# Patient Record
(demographics unavailable — no encounter records)

---

## 2024-10-15 NOTE — HEALTH RISK ASSESSMENT
[Never (0 pts)] : Never (0 points) [No] : In the past 12 months have you used drugs other than those required for medical reasons? No [0] : 2) Feeling down, depressed, or hopeless: Not at all (0) [PHQ-2 Negative - No further assessment needed] : PHQ-2 Negative - No further assessment needed [Never] : Never [Patient declined mammogram] : Patient declined mammogram [Patient reported PAP Smear was normal] : Patient reported PAP Smear was normal [Patient declined colonoscopy] : Patient declined colonoscopy [Fully functional (bathing, dressing, toileting, transferring, walking, feeding)] : Fully functional (bathing, dressing, toileting, transferring, walking, feeding) [Fully functional (using the telephone, shopping, preparing meals, housekeeping, doing laundry, using] : Fully functional and needs no help or supervision to perform IADLs (using the telephone, shopping, preparing meals, housekeeping, doing laundry, using transportation, managing medications and managing finances) [Smoke Detector] : smoke detector [Carbon Monoxide Detector] : carbon monoxide detector [Seat Belt] :  uses seat belt [Audit-CScore] : 0 [de-identified] : Walks a lot [de-identified] : Vegetarian [[id number]] : 0 [MammogramComments] : Breast US in the past demonstrated abnormalities, has implants [PapSmearDate] : 01/2022 [BoneDensityComments] : N/A [ColonoscopyComments] : f/u COLOGUARD

## 2024-10-15 NOTE — ASSESSMENT
[FreeTextEntry1] : Pt is a 60 yo Female with no significant PMH was recently admitted to [facility] for AHRF 2/2 to Mycoplasma PNA. Patient p/w residual cough, tongue discoloration and endorses small pellet-like stools.  #Cough - start benzonatate 100 TID PRN - start Azelastine  #Tongue Abnormality - f/u DERM  #Breast Cyst? - f/u breast US - f/u with gynecology   #Constipation - start bowel regimen with MIRALAX PRN and senna BID  #HCM Vaccines: Interested in Shingles and Flu, reconsider Colonoscopy: f/u COLOGUARD Pap: f/u GYN Mammo: f/u Breast US f/u CBC, CMP, HLD, UA, A1C, nutritionist   RTC IN 1 MONTH or PRN

## 2024-10-15 NOTE — HISTORY OF PRESENT ILLNESS
[FreeTextEntry1] : Establish care, residual cough, tongue discoloration [de-identified] : Pt is a 60 yo Female with no significant PMH was recently admitted to [facility] for AHRF 2/2 to Mycoplasma PNA. Patient reports feeling better, with residual cough. Patient continues to be anxious and experienced SOB during stressful event. Patient also concerned with regards to weight gain and tongue discoloration. Patient endorses small pellet-like stools. Denies TANNER, SOB at rest, orthopnea, PND, nausea/vomiting, diarrhea, chest pain, or palpitations.

## 2024-10-15 NOTE — REVIEW OF SYSTEMS
[Recent Change In Weight] : ~T recent weight change [Constipation] : constipation [Cough] : cough [Negative] : Heme/Lymph [FreeTextEntry4] : Tongue discoloration

## 2024-10-15 NOTE — HISTORY OF PRESENT ILLNESS
[FreeTextEntry1] : Annual   LMP 51 yrs of age, no hx/o PMB  Last Pap() w/hx/o HPV  Last Mammogram - declines mammogram, only does annual Breast U/S due hx/o breast implants.  No personal or family hx/o Breast Ca.  Hx/o R-Breast cyst, s/p clip placement. Encouraged pt to obtain screening Mammogram.  Colorectal screening pt is considering Cologuard - counseling provided recommended colonoscopy as preferred method of screening, pt will f/u with her PCP,     / s/p  x 1 / ETOP x 1 denies hx/o bladder dysfunction.   , dating but not sexually active.  Lives alone, self-employed.  [Post-Menopause, No Sxs] : post-menopausal, currently without symptoms [Menopause Age: ____] : age at menopause was [unfilled] [No] : Patient does not have concerns regarding sex [Previously active] : previously active [Men] : men [Vaginal] : vaginal

## 2024-10-15 NOTE — HISTORY OF PRESENT ILLNESS
[FreeTextEntry1] : Establish care, residual cough, tongue discoloration [de-identified] : Pt is a 62 yo Female with no significant PMH was recently admitted to [facility] for AHRF 2/2 to Mycoplasma PNA. Patient reports feeling better, with residual cough. Patient continues to be anxious and experienced SOB during stressful event. Patient also concerned with regards to weight gain and tongue discoloration. Patient endorses small pellet-like stools. Denies TANNER, SOB at rest, orthopnea, PND, nausea/vomiting, diarrhea, chest pain, or palpitations.

## 2024-10-15 NOTE — ASSESSMENT
[FreeTextEntry1] : Pt is a 62 yo Female with no significant PMH was recently admitted to [facility] for AHRF 2/2 to Mycoplasma PNA. Patient p/w residual cough, tongue discoloration and endorses small pellet-like stools.  #Cough - start benzonatate 100 TID PRN - start Azelastine  #Tongue Abnormality - f/u DERM  #Breast Cyst? - f/u breast US - f/u with gynecology   #Constipation - start bowel regimen with MIRALAX PRN and senna BID  #HCM Vaccines: Interested in Shingles and Flu, reconsider Colonoscopy: f/u COLOGUARD Pap: f/u GYN Mammo: f/u Breast US f/u CBC, CMP, HLD, UA, A1C, nutritionist   RTC IN 1 MONTH or PRN

## 2024-10-15 NOTE — PHYSICAL EXAM
[Normal] : soft, non-tender, non-distended, no masses palpated, no HSM and normal bowel sounds [de-identified] : Central tongue discoloration

## 2024-10-15 NOTE — PHYSICAL EXAM
[Normal] : soft, non-tender, non-distended, no masses palpated, no HSM and normal bowel sounds [de-identified] : Central tongue discoloration

## 2024-10-15 NOTE — HEALTH RISK ASSESSMENT
[Never (0 pts)] : Never (0 points) [No] : In the past 12 months have you used drugs other than those required for medical reasons? No [0] : 2) Feeling down, depressed, or hopeless: Not at all (0) [PHQ-2 Negative - No further assessment needed] : PHQ-2 Negative - No further assessment needed [Never] : Never [Patient declined mammogram] : Patient declined mammogram [Patient reported PAP Smear was normal] : Patient reported PAP Smear was normal [Patient declined colonoscopy] : Patient declined colonoscopy [Fully functional (bathing, dressing, toileting, transferring, walking, feeding)] : Fully functional (bathing, dressing, toileting, transferring, walking, feeding) [Fully functional (using the telephone, shopping, preparing meals, housekeeping, doing laundry, using] : Fully functional and needs no help or supervision to perform IADLs (using the telephone, shopping, preparing meals, housekeeping, doing laundry, using transportation, managing medications and managing finances) [Smoke Detector] : smoke detector [Carbon Monoxide Detector] : carbon monoxide detector [Seat Belt] :  uses seat belt [Audit-CScore] : 0 [de-identified] : Walks a lot [de-identified] : Vegetarian [[id number]] : 0 [MammogramComments] : Breast US in the past demonstrated abnormalities, has implants [PapSmearDate] : 01/2022 [BoneDensityComments] : N/A [ColonoscopyComments] : f/u COLOGUARD

## 2024-10-15 NOTE — END OF VISIT
[] : Resident [FreeTextEntry3] : I, Dr. Augustine Lynch, saw and evaluated this patient in the presence of the resident. I discussed the management with the resident. I reviewed the note and agree with the documented plan of care with the following confirmations/corrections/additions: None.

## 2024-10-15 NOTE — PHYSICAL EXAM
[Chaperone Present] : A chaperone was present in the examining room during all aspects of the physical examination [[id number]] : A chaperone was present during the pelvic exam. [Appropriately responsive] : appropriately responsive [Alert] : alert [No Acute Distress] : no acute distress [No Lymphadenopathy] : no lymphadenopathy [Soft] : soft [Non-tender] : non-tender [Non-distended] : non-distended [No HSM] : No HSM [No Lesions] : no lesions [No Mass] : no mass [Oriented x3] : oriented x3 [Examination Of The Breasts] : a normal appearance [No Masses] : no breast masses were palpable [Labia Majora] : normal [Labia Minora] : normal [Atrophy] : atrophy [Cystocele] : a cystocele [Rectocele] : a rectocele [Normal] : normal

## 2025-02-07 NOTE — HISTORY OF PRESENT ILLNESS
[No Pertinent Cardiac History] : no history of aortic stenosis, atrial fibrillation, coronary artery disease, recent myocardial infarction, or implantable device/pacemaker [No Pertinent Pulmonary History] : no history of asthma, COPD, sleep apnea, or smoking [No Adverse Anesthesia Reaction] : no adverse anesthesia reaction in self or family member [(Patient denies any chest pain, claudication, dyspnea on exertion, orthopnea, palpitations or syncope)] : Patient denies any chest pain, claudication, dyspnea on exertion, orthopnea, palpitations or syncope [Good (7-10 METs)] : Good (7-10 METs) [Chronic Anticoagulation] : no chronic anticoagulation [Chronic Kidney Disease] : no chronic kidney disease [Diabetes] : no diabetes [FreeTextEntry1] : Abdominal Laser Liposuction [FreeTextEntry2] : March 17, 2025 [FreeTextEntry4] : Pt is a 62 yo Female with no significant PMH here for preop clearance for abdominal laser liposuction. The only medication she is currently taking is senna PRN for constipation. Denies any adverse cardiac events, cerebrovascular events or any allergic reactions to anesthesia. Denies chest pain, SOB, palpitations, fatigue, dizziness, dysuria or diarrhea. RCRI: 0 (3.9%) Cano score: 0 METs 9.7

## 2025-02-07 NOTE — PLAN
[FreeTextEntry1] : Pt is a 62 yo Female with no significant PMH here for preop clearance for abdominal laser liposuction.   1. Preop clearance for abdominal laser liposuction - f/u CBC, CMP, PTT, PT/INR, HIV Ag/Ab, HCV Ab, UA, EKG  2. Constipation - c/w senna at bedtime PRN

## 2025-02-07 NOTE — PLAN
[FreeTextEntry1] : Pt is a 60 yo Female with no significant PMH here for preop clearance for abdominal laser liposuction.   1. Preop clearance for abdominal laser liposuction - f/u CBC, CMP, PTT, PT/INR, HIV Ag/Ab, HCV Ab, UA, EKG  2. Constipation - c/w senna at bedtime PRN

## 2025-02-07 NOTE — ASSESSMENT
[FreeTextEntry4] : Pt is a 62 yo Female with no significant PMH here for preop clearance for abdominal laser liposuction.

## 2025-03-06 NOTE — PHYSICAL EXAM
[Alert] : alert [No Acute Distress] : no acute distress [Conjunctiva] : the conjunctiva were normal in both eyes [PERRL] : pupils were equal in size, round, and reactive to light [EOM Intact] : extraocular movements were intact [Outer Ear] : the ears and nose were normal in appearance [Oropharynx] : the oropharynx was normal [Normal Appearance] : was normal in appearance [Neck Supple] : was supple [Enlarged Diffusely] : was not enlarged [___] : a single ~M [unfilled] ~Ucm nodule palpated on the right lobe of the thyroid [Rate ___] : at [unfilled] breaths per minute [Normal Rhythm/Effort] : normal respiratory rhythm and effort [Clear Bilaterally] : the lungs were clear to auscultation bilaterally [Normal to Percussion] : the lungs were normal to percussion [5th Left ICS - MCL] : palpated at the 5th LICS in the midclavicular line [Normal Rate] : normal [Heart Rate ___] : [unfilled] bpm [Rhythm Regular] : regular [Normal S1] : normal S1 [Normal S2] : normal S2 [S3] : no S3 [S4] : no S4 [No Murmur] : no murmurs heard [No Pitting Edema] : no pitting edema present [Rt] : varicose veins of the right leg noted [Lt] : varicose veins of the left leg noted [Right Carotid Bruit] : no bruit heard over the right carotid [Left Carotid Bruit] : no bruit heard over the left carotid [Right Femoral Bruit] : no bruit heard over the right femoral artery [Left Femoral Bruit] : no bruit heard over the left femoral artery [2+] : left 2+ [No Abnormalities] : the abdominal aorta was not enlarged and no bruit was heard [Bruit] : no bruit heard [Soft, Nontender] : the abdomen was soft and nontender [No Mass] : no masses were palpated [No HSM] : no hepatosplenomegaly noted [None] : no CVA tenderness [Postauricular Lymph Nodes Enlarged Bilaterally] : nodes not enlarged [Preauricular Lymph Nodes Enlarged Bilaterally] : nodes not enlarged [Submandibular Lymph Nodes Enlarged Bilaterally] : nodes not enlarged [Suboccipital Lymph Nodes Enlarged Bilaterally] : nodes not enlarged [Submental Lymph Nodes Enlarged] : nodes not enlarged [Cervical Lymph Nodes Enlarged Posterior Bilaterally] : nodes not enlarged [Cervical Lymph Nodes Enlarged Anterior Bilaterally] : nodes not enlarged [Supraclavicular Lymph Nodes Enlarged Bilaterally] : nodes not enlarged [Axillary Lymph Nodes Enlarged Bilaterally] : nodes not enlarged [Epitrochlear Lymph Nodes Enlarged Bilaterally] : nodes not enlarged [Femoral Lymph Nodes Enlarged Bilaterally] : nodes not enlarged [Inguinal Lymph Nodes Enlarged Bilaterally] : nodes not enlarged [No Lymphangitis] : no lymphangitis observed [No Visual Abnormalities] : no visible abnormalities [Normal Lordosis] : normal lordosis [No Scoliosis] : no scoliosis [No Tenderness to Palpation] : no spine tenderness on palpation [No Masses] : no masses [Full ROM] : full ROM [No Pain with ROM] : no pain with motion in any direction [Intact] : all reflexes within normal limits bilaterally [Normal Station and Gait] : the gait and station were normal [Normal Motor Tone] : the muscle tone was normal [Involuntary Movements] : no involuntary movements were seen [Normal Scalp] : inspection of the scalp showed no abnormalities [Examination Of The Hair] : texture and distribution of hair was normal [Abnormal Color] : normal color and pigmentation [Complexion Medium] : medium complexion [Skin Lesions 1] : no skin lesions were observed [Tattoo - Single] : no tattoos observed [Skin Turgor Decreased] : normal skin turgor [Normal] : the deep tendon reflexes were normal [Normal Mental Status] : the patient's orientation, memory, attention, language and fund of knowledge were normal [Appropriate] : appropriate [Impaired judgment] : intact judgment [Impaired Insight] : intact insight [de-identified] :  lips   [de-identified] :  breast  examined sitting and supine  bilateral implants   no masses no nipple abn or discharge.

## 2025-03-06 NOTE — ASSESSMENT
[FreeTextEntry1] :  1 constipation  Fiber  is a substance found in plants. Dietary fiber is a type of carbohydrate we eat. Eating the right amount of fiber has been shown to have a range of health benefits. It helps you feel full longer, which can curb overeating and weight gain. Eating fiber-rich foods aids in digestion  and the absorption of nutrients.  Foods that are high in fiber can help treat certain issues. These include constipation, irritable bowel  syndrome (IBS), and diverticulitis. Dietary fiber can reduce your risk of coronary heart  disease, type 2 diabetes, and some cancers. It also may lower your cholesterol.  Path to improved health  The amount of fiber you should get from your daily diet  depends on your age and gender. Men 50 years of age and younger should consume at least 38 grams of fiber per day. Men older than 50 years of age should get at least 30 grams of fiber daily. Women 50 years of age and younger should consume at least 25 grams of fiber per day. Women older than 50 years of age should get at least 21 grams of fiber daily.  The following changes can increase the fiber in your diet. 1.Eat at least 2 cups of fruits and 2 1/2 cups of vegetables each day. This can include: 1 artichoke 1 medium sweet potato 1 small pear  cup of green peas  cup of berries, such as raspberries or blackberries  cup of prunes  cup figs or dates  cup of spinach 1 medium apple 1 medium orange. 2.Replace refined white bread with whole-grain breads and cereals. Choose brown rice instead of white rice. Eat the following foods: 100% whole-wheat bread oatmeal brown rice bran muffins bran or multiple-grain cereals, cooked or dry popcorn (unbuttered) almonds. 3.Check nutrition fact labels for the amount of dietary fiber. Try to get 5 grams of fiber per serving. 4.Add  cup of wheat bran (millers bran) to foods. You can put it in cooked cereal, applesauce, or meat loaf. 5.Eat  cup cooked beans, such as navy, kidney, [place], black, lima, or white beans.  Things to consider  Try not to add too much fiber to your diet at once. You may get symptoms such as bloating, cramping, or gas. You can prevent these by increasing your fiber slowly. Start with one of the changes listed above. Wait several days to a week before making another. If one change doesnt seem to work for you, try a different one. Be sure to drink more fluids as you increase the amount of fiber you eat. Fluids help your body digest fiber. Try to drink 8 glasses a day. Choose no- or low-calorie beverages, such as water, unsweetened tea, or diet soda. 2  colon cancer screening  The finding of polyps in the colon or rectum often raises questions for patients and their family. What is the significance of finding a polyp? Does this mean that I have, or will develop, colon or rectal (colorectal) cancer? Will a polyp require surgery?  Some types of polyps (called adenomas) have the potential to become cancerous, while others (hyperplastic or inflammatory polyps) have virtually no chance of becoming cancerous.  When discussing colon polyps, the following points should be considered:  ?Polyps are common (they occur in 30 to 50 percent of adults)  ?Not all polyps will become cancer  ?It takes many years for a polyp to become cancerous  ?Polyps can be completely and safely removed   The best course of action when a polyp is found depends upon the number, type, size, and location of the polyp. People who have an adenoma removed will require a follow-up examination; new polyps may develop over time that need to be removed.   COLON POLYP CAUSES Polyps are very common in men and women of all races who live in industrialized countries, suggesting that dietary and environmental factors play a role in their development.  Lifestyle  Although the exact causes are not completely understood, lifestyle risk factors include the following:  ?A high-fat diet  ?A diet high in red meat  ?A low-fiber diet  ?Cigarette smoking  ?Obesity   On the other hand, use of aspirin and other nonsteroidal anti-inflammatory drugs and a high-calcium diet may protect against the development of colon cancer. (See "Patient education: Screening for colorectal cancer (Beyond the Basics)".)  Aging  Colorectal cancer and polyps are uncommon before age 40. Ninety percent of cases occur after age 50, with men somewhat more likely to develop polyps than women; therefore, colon cancer screening is usually recommended starting at age 50 for both sexes. It takes approximately 10 years for a small polyp to develop into cancer.  Family history and genetics  Polyps and colon cancer tend to run in families, suggesting that genetic factors are important in their development.  Any history of colon polyps or colon cancer in the family should be discussed with a healthcare provider, particularly if cancer developed at an early age, in close relatives, or in multiple family members. As a general rule, screening for colon cancer begins at an earlier age in people with a family history of cancer or polyps.  Rare genetic diseases can cause high rates of colorectal cancer relatively early in adult life. Familial adenomatous polyposis and MUTYH-associated polyposis cause multiple colon polyps. Another, hereditary nonpolyposis colon cancer or Magallon syndrome, increases the risk of colon cancer, but does not cause a large number of polyps. Testing for these genes may be recommended for families with high rates of cancer, but is not generally recommended for other groups.   TYPES OF COLON POLYPS The most common types of polyps are hyperplastic and adenomatous polyps. Other types of polyps can also be found in the colon, although these are far less common and are not discussed here.  Hyperplastic polyps  Hyperplastic polyps are usually small, located in the end-portion of the colon (the rectum and sigmoid colon), have no potential to become malignant, and are not worrisome (figure 1). It is not always possible to distinguish a hyperplastic polyp from an adenomatous polyp based upon appearance during colonoscopy, which means that hyperplastic polyps are often removed or biopsied to allow microscopic examination.  Adenomatous polyps  Two-thirds of colon polyps are adenomas. Most of these polyps do not develop into cancer, although they have the potential to become cancerous. Adenomas are classified by their size, general appearance, and their specific features as seen under the microscope.  As a general rule, the larger the adenoma, the more likely it is to eventually become a cancer. As a result, large polyps (larger than 5 millimeters, approximately 3/8 inch) are usually removed completely to prevent cancer and for microscopic examination to guide follow-up testing.  Malignant polyps  Polyps that contain cancerous cells are known as malignant polyps. The optimal treatment for malignant polyps depends upon the extent of the cancer (when examined with a microscope) and other individual factors. (See "Overview of colon polyps".)   COLON POLYP DIAGNOSIS Polyps usually do not cause symptoms but may be detected during a colon cancer screening examination (such as flexible sigmoidoscopy or colonoscopy) (picture 1) or after a positive screening test for occult blood in the stool.   Colonoscopy is the best way to evaluate the colon because it allows the clinician to see the entire lining of the colon and remove most polyps that are found (occasionally, large polyps need to be removed during a separate procedure). During colonoscopy, a clinician inserts a very thin, flexible tube with a light source and small camera into the anus. The tube is advanced through the entire length of the large intestine (colon). (See "Patient education: Colonoscopy (Beyond the Basics)".)  The inside of the colon is a tube-like structure with a flat surface with curved folds. A polyp appears as a lump that protrudes into the inside of the colon (picture 1). The tissue covering a polyp may look the same as normal colon tissue, or, there may be tissue changes ranging from subtle color changes to ulceration and bleeding. Some polyps are flat ("sessile") and others extend out on a stalk ("pedunculated").  Colonoscopy is the best test for the follow-up examination of polyps. Virtual colonoscopy using computed tomography technology is another test used to detect polyps.   COLON POLYP REMOVAL Colorectal cancer is preventable if precancerous polyps (ie, adenomas) are detected and removed before they become malignant (cancerous). Over time, small polyps can change their structure and become cancerous. Polyps are usually removed when they are found on colonoscopy, which eliminates the chance for that polyp to become cancerous.  Procedure  The medical term for removing polyps is polypectomy. Most polypectomies can be performed through a colonoscope. Small polyps can be removed with an instrument that is inserted through the colonoscope and snips off small pieces of tissue. Larger polyps are usually removed by placing a noose, or snare, around the polyp base and burning through it with electric cautery (figure 2). The cautery also helps to stop bleeding after the polyp is removed.  Polyp removal is not painful because the lining of the colon does not have the ability to feel pain. In addition, a sedative medication is given before the colonoscopy to prevent pain caused by stretching of the colon. Rarely, a polyp will be too large to remove during colonoscopy, which means that a surgical procedure will be needed at a later time.  Complications  Polypectomy is safe although it has a few potential risks and complications. The most common complications are bleeding and perforation (creating a hole in the colon). Fortunately, this occurs infrequently (one in 1000 patients having colonoscopy). Bleeding can usually be controlled during colonoscopy by cauterizing (applying heat) to the bleeding site; surgery is sometimes required for perforation.  Medication use  Nonsteroidal anti-inflammatory drugs including aspirin, ibuprofen (sample brand names: Advil, Motrin), and naproxen (sample brand name: Aleve) can usually be continued before your colonoscopy. Acetaminophen (sample brand name: Tylenol) is safe to take. People who require anti-clotting medications such as warfarin (sample brand name: Coumadin) should discuss how and when to stop and resume this medication with their clinician.   COLON CANCER PREVENTION  Follow-up colonoscopy  Patients should discuss the results of the tissue analysis when they are available, within a few weeks after the procedure, to decide if and when a follow-up examination is needed. 3 bmi 26  Weight loss, exercise, and diet control were discussed and are highly encouraged. Treatment options were given such as, aqua therapy, and contacting a nutritionist. Recommended to use the elliptical, stationary bike, less use of treadmill. Mindful eating was explained to the patient Obesity is associated with worsening asthma, shortness of breath, and potential for cardiac disease, diabetes, and other underlying medical conditions.   -Nutrition and lifestyle concepts reviewed in detail. Recommending an unprocessed diet with >= 50% of nutrients from whole plant sources; most food early in the day; most calories before 4 pm, and no food after 8 pm; advised starting with small sustainable changes and building up over time. The long-term plan for this type of dietary change was reviewed. A number of resources to help in initiating these changes were provided. -A particular emphasis was placed on the need to remove processed foods from the diet. The concept of insulin resistance was introduced as important for understanding effective weight loss measures. Educational handouts explaining these concepts were provided.   Pt wants to wait till end of May since she is having  liposunction done March 19th and then going away  in May